# Patient Record
Sex: MALE | Race: WHITE | NOT HISPANIC OR LATINO | URBAN - METROPOLITAN AREA
[De-identification: names, ages, dates, MRNs, and addresses within clinical notes are randomized per-mention and may not be internally consistent; named-entity substitution may affect disease eponyms.]

---

## 2019-06-03 ENCOUNTER — OFFICE VISIT (OUTPATIENT)
Dept: URBAN - METROPOLITAN AREA CLINIC 18 | Facility: CLINIC | Age: 24
End: 2019-06-03
Payer: COMMERCIAL

## 2019-06-03 DIAGNOSIS — H52.13 MYOPIA, BILATERAL: Primary | ICD-10-CM

## 2019-06-03 PROCEDURE — 92014 COMPRE OPH EXAM EST PT 1/>: CPT | Performed by: OPTOMETRIST

## 2019-06-03 ASSESSMENT — VISUAL ACUITY
OD: 20/20
OS: 20/20

## 2019-06-03 ASSESSMENT — INTRAOCULAR PRESSURE
OD: 16
OS: 18

## 2019-06-03 ASSESSMENT — KERATOMETRY
OS: 42.88
OD: 42.88

## 2022-06-29 ENCOUNTER — OFFICE VISIT (OUTPATIENT)
Dept: URBAN - METROPOLITAN AREA CLINIC 18 | Facility: CLINIC | Age: 27
End: 2022-06-29
Payer: COMMERCIAL

## 2022-06-29 DIAGNOSIS — T15.01XA FOREIGN BODY IN CORNEA, RIGHT EYE: ICD-10-CM

## 2022-06-29 DIAGNOSIS — S05.01XA CORNEAL ABRASION OF RIGHT EYE, INITIAL ENCOUNTER: Primary | ICD-10-CM

## 2022-06-29 PROCEDURE — 99203 OFFICE O/P NEW LOW 30 MIN: CPT | Performed by: OPTOMETRIST

## 2022-06-29 RX ORDER — TOBRAMYCIN AND DEXAMETHASONE 1; 3 MG/ML; MG/ML
SUSPENSION/ DROPS OPHTHALMIC
Qty: 5 | Refills: 0 | Status: ACTIVE
Start: 2022-06-29

## 2022-06-29 ASSESSMENT — INTRAOCULAR PRESSURE
OD: 18
OS: 17

## 2022-06-29 NOTE — IMPRESSION/PLAN
Impression: Foreign body in cornea, right eye: T15.01XA. Plan: Removed metallic FB that was embedded in tarsal conj of RUL. Removed at slit lamp with curved forceps.

## 2022-06-29 NOTE — IMPRESSION/PLAN
Impression: Corneal abrasion of right eye, initial encounter: S05. 01XA. Plan: Corneal abrasion OD on nasal cornea. Vertical lines from metallic FB embbeded under UL. ERx generic TobraDex BID OD for 1 week.

## 2022-07-01 ENCOUNTER — OFFICE VISIT (OUTPATIENT)
Dept: URBAN - METROPOLITAN AREA CLINIC 18 | Facility: CLINIC | Age: 27
End: 2022-07-01
Payer: COMMERCIAL

## 2022-07-01 DIAGNOSIS — S05.01XS CORNEAL ABRASION W/O FB OF RIGHT EYE, SEQUELA: Primary | ICD-10-CM

## 2022-07-01 PROCEDURE — 99213 OFFICE O/P EST LOW 20 MIN: CPT | Performed by: OPTOMETRIST

## 2022-07-01 NOTE — IMPRESSION/PLAN
Impression: Corneal abrasion w/o FB of right eye, sequela: S05.01XS. Plan: Abrasion healed, continue with Tobramycin as directed.  RTC PRN